# Patient Record
Sex: FEMALE | Race: WHITE | NOT HISPANIC OR LATINO | Employment: OTHER | URBAN - METROPOLITAN AREA
[De-identification: names, ages, dates, MRNs, and addresses within clinical notes are randomized per-mention and may not be internally consistent; named-entity substitution may affect disease eponyms.]

---

## 2017-11-01 ENCOUNTER — TRANSFERRED RECORDS (OUTPATIENT)
Dept: HEALTH INFORMATION MANAGEMENT | Facility: CLINIC | Age: 72
End: 2017-11-01

## 2017-12-06 ENCOUNTER — RADIANT APPOINTMENT (OUTPATIENT)
Dept: MAMMOGRAPHY | Facility: CLINIC | Age: 72
End: 2017-12-06
Payer: COMMERCIAL

## 2017-12-06 ENCOUNTER — OFFICE VISIT (OUTPATIENT)
Dept: OBGYN | Facility: CLINIC | Age: 72
End: 2017-12-06
Payer: COMMERCIAL

## 2017-12-06 ENCOUNTER — RADIANT APPOINTMENT (OUTPATIENT)
Dept: BONE DENSITY | Facility: CLINIC | Age: 72
End: 2017-12-06
Payer: COMMERCIAL

## 2017-12-06 VITALS
BODY MASS INDEX: 27.58 KG/M2 | WEIGHT: 136.8 LBS | SYSTOLIC BLOOD PRESSURE: 142 MMHG | HEART RATE: 84 BPM | DIASTOLIC BLOOD PRESSURE: 88 MMHG | HEIGHT: 59 IN

## 2017-12-06 DIAGNOSIS — Z12.31 VISIT FOR SCREENING MAMMOGRAM: ICD-10-CM

## 2017-12-06 DIAGNOSIS — Z78.0 ASYMPTOMATIC POSTMENOPAUSAL STATE: ICD-10-CM

## 2017-12-06 DIAGNOSIS — Z01.419 ENCOUNTER FOR GYNECOLOGICAL EXAMINATION WITHOUT ABNORMAL FINDING: Primary | ICD-10-CM

## 2017-12-06 PROCEDURE — 99397 PER PM REEVAL EST PAT 65+ YR: CPT | Performed by: OBSTETRICS & GYNECOLOGY

## 2017-12-06 PROCEDURE — G0476 HPV COMBO ASSAY CA SCREEN: HCPCS | Performed by: OBSTETRICS & GYNECOLOGY

## 2017-12-06 PROCEDURE — G0202 SCR MAMMO BI INCL CAD: HCPCS | Mod: TC

## 2017-12-06 PROCEDURE — 77080 DXA BONE DENSITY AXIAL: CPT | Performed by: OBSTETRICS & GYNECOLOGY

## 2017-12-06 PROCEDURE — G0145 SCR C/V CYTO,THINLAYER,RESCR: HCPCS | Performed by: OBSTETRICS & GYNECOLOGY

## 2017-12-06 RX ORDER — ACETAMINOPHEN 160 MG
2000 TABLET,DISINTEGRATING ORAL
COMMUNITY
Start: 2017-01-06

## 2017-12-06 RX ORDER — SIMVASTATIN 20 MG
TABLET ORAL
COMMUNITY
Start: 2017-05-02

## 2017-12-06 ASSESSMENT — PATIENT HEALTH QUESTIONNAIRE - PHQ9
SUM OF ALL RESPONSES TO PHQ QUESTIONS 1-9: 0
5. POOR APPETITE OR OVEREATING: NOT AT ALL

## 2017-12-06 ASSESSMENT — ANXIETY QUESTIONNAIRES
5. BEING SO RESTLESS THAT IT IS HARD TO SIT STILL: NOT AT ALL
3. WORRYING TOO MUCH ABOUT DIFFERENT THINGS: NOT AT ALL
1. FEELING NERVOUS, ANXIOUS, OR ON EDGE: NOT AT ALL
6. BECOMING EASILY ANNOYED OR IRRITABLE: NOT AT ALL
7. FEELING AFRAID AS IF SOMETHING AWFUL MIGHT HAPPEN: NOT AT ALL
2. NOT BEING ABLE TO STOP OR CONTROL WORRYING: NOT AT ALL
GAD7 TOTAL SCORE: 0

## 2017-12-06 NOTE — MR AVS SNAPSHOT
"              After Visit Summary   2017    Susie Lara    MRN: [id number]           Patient Information     Date Of Birth          1945        Visit Information        Provider Department      2017 11:00 AM Dale Oneill MD [facility]        Today's Diagnoses     Encounter for gynecological examination without abnormal finding    -  1       Follow-ups after your visit        Who to contact     If you have questions or need follow up information about today's clinic visit or your schedule please contact [facility] directly at [phone].  Normal or non-critical lab and imaging results will be communicated to you by [employer], letter or phone within 4 business days after the clinic has received the results. If you do not hear from us within 7 days, please contact the clinic through [employer] or phone. If you have a critical or abnormal lab result, we will notify you by phone as soon as possible.  Submit refill requests through [employer] or call your pharmacy and they will forward the refill request to us. Please allow 3 business days for your refill to be completed.          Additional Information About Your Visit        MyChart Information     [employer] lets you send messages to your doctor, view your test results, renew your prescriptions, schedule appointments and more. To sign up, go to [email or web address] . Click on \"Log in\" on the left side of the screen, which will take you to the Welcome page. Then click on \"Sign up Now\" on the right side of the page.     You will be asked to enter the access code listed below, as well as some personal information. Please follow the directions to create your username and password.     Your access code is: [email or web address]  Expires: 3/11/2018  9:14 AM     Your access code will  in 90 days. If you need help or a new code, please call your [place] clinic or [phone].        Care EveryWhere ID     This is your " "Care EveryWhere ID. This could be used by other organizations to access your [place] medical records  [id number]        Your Vitals Were     Pulse Height BMI (Body Mass Index)             84 4' 11.25\" (1.505 m) 27.4 kg/m2          Blood Pressure from Last 3 Encounters:   12/06/17 142/88   10/05/16 130/80   09/15/15 126/82    Weight from Last 3 Encounters:   12/06/17 136 lb 12.8 oz (62.1 kg)   10/05/16 139 lb (63 kg)   09/15/15 141 lb (64 kg)              We Performed the Following     HPV High Risk Types DNA Cervical     Pap imaged thin layer screen with HPV - recommended age 30 - 65        Primary Care Provider Office Phone # Fax #    Travis Hernandez [phone]       [place] [id number] [place] [address] [place] [id number]        Equal Access to Services     CLAUS SOMERS : Hadii aad ku hadasho Soomaali, waaxda luqadaha, qaybta kaalmada adeegyada, waxay devinin hayalexander zaldivar . So [facility] [phone].    ATENCIÓN: Si habla español, tiene a santiago disposición servicios gratuitos de asistencia lingüística. Krystina al [phone].    We comply with applicable federal civil rights laws and [place] laws. We do not discriminate on the basis of race, color, national origin, age, disability, sex, sexual orientation, or gender identity.            Thank you!     Thank you for choosing [facility]  for your care. Our goal is always to provide you with excellent care. Hearing back from our patients is one way we can continue to improve our services. Please take a few minutes to complete the written survey that you may receive in the mail after your visit with us. Thank you!             Your Updated Medication List - Protect others around you: Learn how to safely use, store and throw away your medicines at www.disposemymeds.org.          This list is accurate as of: 12/6/17 11:59 PM.  Always use your most recent med list.                   Brand Name Dispense Instructions " for use Diagnosis    butalbital-acetaminophen-caffeine -40 MG per tablet    FIORICET/ESGIC          simvastatin 20 MG tablet    ZOCOR          vitamin D3 2000 UNITS Caps      Take 2,000 Units by mouth

## 2017-12-06 NOTE — LETTER
December 14, 2017      Susie ARRIAZA Marco  [address] [facility] [address]    Dear ,      I am happy to inform you that your cervical cancer screening test (PAP smear) was normal and your Human Papillomavirus (HPV) test was negative.    Per current guidelines, you no longer need to have pap smears completed. Please return for annual pelvic exams.    Please continue to be seen every year for an annual wellness visit and other preventative tests.     Please contact my office at [phone] if you have further questions.    Sincerely,      Dale Oneill MD/ayah

## 2017-12-06 NOTE — PROGRESS NOTES
Susie is a 72 year old No obstetric history on file. female who presents for annual exam.     Besides routine health maintenance, she has no other health concerns today .    Do you have a Health Care Directive?: No: Advance care planning was reviewed with patient; patient declined at this time.      Fall risk:   Fallen 2 or more times in the past year?: No  Any fall with injury in the past year?: No      HPI: The patient is seen at this time for her annual exam. She has no current health concerns at all. She is here for her screening today.  The patient's PCP is Travis Hernandez MD.      GYNECOLOGIC HISTORY:No LMP recorded. Patient has had a hysterectomy..   reports that she has never smoked. She has never used smokeless tobacco.      Patient is sexually active.  STD testing offered?  Declined  Last PHQ-9 score on record=   PHQ-9 SCORE 12/6/2017   Total Score 0     Last GAD7 score on record=   RENNY-7 SCORE 10/5/2016 12/6/2017   Total Score 0 0     Alcohol Score = 2    HEALTH MAINTENANCE:  Cholesterol: (No results found for: CHOL   Last Mammo: 10/5/16, Result: normal, Next Mammo: today   Pap: 10/5/16 neg  DEXA: 9/15/15  Colonoscopy:  2007 per patient, Result:  normal, Next Colonoscopy: gets them through her pcp. Know she is due    Health maintenance updated:  yes    HISTORY:  Obstetric History     No data available        Patient Active Problem List   Diagnosis     Osteopenia     Past Surgical History:   Procedure Laterality Date     APPENDECTOMY       AS RAD RESEC TONSIL/PILLARS  1957     BREAST BIOPSY, RT/LT  1999    left     BUNIONECTOMY  2003     HYSTERECTOMY  1979    hyster     KIDNEY SURGERY      for stones      Social History   Substance Use Topics     Smoking status: Never Smoker     Smokeless tobacco: Never Used     Alcohol use Yes      Comment: occationally      Problem (# of Occurrences) Relation (Name,Age of Onset)    Colon Cancer (1) Paternal Grandfather    Hypertension (2) Mother, Brother       Negative  "family history of: Coronary Artery Disease, DIABETES            Current Outpatient Prescriptions   Medication Sig     simvastatin (ZOCOR) 20 MG tablet      Cholecalciferol (VITAMIN D3) 2000 UNITS CAPS Take 2,000 Units by mouth     butalbital-acetaminophen-caffeine (FIORICET, ESGIC) -40 MG per tablet      No current facility-administered medications for this visit.        Allergies   Allergen Reactions     Ivp Dye [Contrast Dye]      Morphine      Sulfa Drugs        Past medical, surgical, social and family history were reviewed and updated in EPIC.    ROS:   12 point review of systems negative other than symptoms noted below.  Musculoskeletal: Joint Pain    EXAM:  /88  Pulse 84  Ht 4' 11.25\" (1.505 m)  Wt 136 lb 12.8 oz (62.1 kg)  BMI 27.4 kg/m2   BMI: Body mass index is 27.4 kg/(m^2).    EXAM:  Constitutional: Appearance: Well nourished, well developed alert, in no acute distress  Neck:  Lymph Nodes:  No lymphadenopathy present    Thyroid:  Gland size normal, nontender, no nodules or masses present  on palpation  Chest:  Respiratory Effort:  Breathing unlabored  Cardiovascular:Heart    Auscultation:  Regular rate, normal rhythm, no murmurs present  Breasts: Inspection of Breasts:  No lymphadenopathy present., Palpation of Breasts and Axillae:  No masses present on palpation, no breast tenderness., Axillary Lymph Nodes:  No lymphadenopathy present. and No nodularity, asymmetry or nipple discharge bilaterally.  Gastrointestinal:  Abdominal Examination:  Abdomen nontender to palpation, tone normal without     rigidity or guarding, no masses present, umbilicus without lesions    Liver and speen:  No hepatomegaly present, liver nontender to palpation    Hernias:  No hernias present  Lymphatic: Lymph Nodes:  No other lymphadenopathy present  Skin:  General Inspection:  No rashes present, no lesions present, no areas of  discoloration.    Genitalia and Groin:  No rashes present, no lesions present, no " areas of  discoloration, no masses present  Neurologic/Psychiatric:    Mental Status:  Oriented X3     Pelvic Exam:  External Genitalia:     Normal appearance for age, no discharge present, no tenderness present, no inflammatory lesions present, color normal  Vagina:     Normal vaginal vault without central or paravaginal defects, no discharge present, no inflammatory lesions present, no masses present  Bladder:     Nontender to palpation  Urethra:   Urethral Body:  Urethra palpation normal, urethra structural support normal   Urethral Meatus:  No erythema or lesions present  Cervix:     Surgically absent  Uterus:     Surgically absent  Adnexa:     Surgically absent  Perineum:     Perineum within normal limits, no evidence of trauma, no rashes or skin lesions present  Anus:     Anus within normal limits, no hemorrhoids present  Inguinal Lymph Nodes:     No lymphadenopathy present      COUNSELING:   Reviewed preventive health counseling, as reflected in patient instructions       Regular exercise       Healthy diet/nutrition    BMI:  Body mass index is 27.4 kg/(m^2).     reports that she has never smoked. She has never used smokeless tobacco.        ASSESSMENT:  72 year old female with satisfactory annual exam.  ICD-10-CM    1. Encounter for gynecological examination without abnormal finding Z01.419 Pap imaged thin layer screen with HPV - recommended age 30 - 65     HPV High Risk Types DNA Cervical       PLAN: We will convey the patient's Pap results. She will probably not need Pap smears in the future. She does need ongoing mammography because of a cancerous cells on previous biopsy. We will discuss her bone density with her.      Dale Oneill MD

## 2017-12-07 ASSESSMENT — ANXIETY QUESTIONNAIRES: GAD7 TOTAL SCORE: 0

## 2017-12-08 LAB
COPATH REPORT: NORMAL
PAP: NORMAL

## 2017-12-12 LAB
FINAL DIAGNOSIS: NORMAL
HPV HR 12 DNA CVX QL NAA+PROBE: NEGATIVE
HPV16 DNA SPEC QL NAA+PROBE: NEGATIVE
HPV18 DNA SPEC QL NAA+PROBE: NEGATIVE
SPECIMEN DESCRIPTION: NORMAL

## 2018-12-31 ENCOUNTER — RADIANT APPOINTMENT (OUTPATIENT)
Dept: MAMMOGRAPHY | Facility: CLINIC | Age: 73
End: 2018-12-31
Payer: COMMERCIAL

## 2018-12-31 ENCOUNTER — OFFICE VISIT (OUTPATIENT)
Dept: OBGYN | Facility: CLINIC | Age: 73
End: 2018-12-31
Payer: COMMERCIAL

## 2018-12-31 VITALS
SYSTOLIC BLOOD PRESSURE: 152 MMHG | WEIGHT: 136 LBS | BODY MASS INDEX: 27.42 KG/M2 | DIASTOLIC BLOOD PRESSURE: 84 MMHG | HEIGHT: 59 IN | HEART RATE: 72 BPM

## 2018-12-31 DIAGNOSIS — Z12.31 VISIT FOR SCREENING MAMMOGRAM: ICD-10-CM

## 2018-12-31 DIAGNOSIS — Z01.419 ENCOUNTER FOR GYNECOLOGICAL EXAMINATION WITHOUT ABNORMAL FINDING: Primary | ICD-10-CM

## 2018-12-31 PROCEDURE — G0476 HPV COMBO ASSAY CA SCREEN: HCPCS | Performed by: OBSTETRICS & GYNECOLOGY

## 2018-12-31 PROCEDURE — 77067 SCR MAMMO BI INCL CAD: CPT | Mod: TC

## 2018-12-31 PROCEDURE — 99397 PER PM REEVAL EST PAT 65+ YR: CPT | Performed by: OBSTETRICS & GYNECOLOGY

## 2018-12-31 PROCEDURE — G0145 SCR C/V CYTO,THINLAYER,RESCR: HCPCS | Performed by: OBSTETRICS & GYNECOLOGY

## 2018-12-31 RX ORDER — FEXOFENADINE HCL 180 MG/1
180 TABLET ORAL
COMMUNITY
Start: 2014-06-23

## 2018-12-31 ASSESSMENT — ANXIETY QUESTIONNAIRES
3. WORRYING TOO MUCH ABOUT DIFFERENT THINGS: NOT AT ALL
1. FEELING NERVOUS, ANXIOUS, OR ON EDGE: NOT AT ALL
GAD7 TOTAL SCORE: 0
6. BECOMING EASILY ANNOYED OR IRRITABLE: NOT AT ALL
7. FEELING AFRAID AS IF SOMETHING AWFUL MIGHT HAPPEN: NOT AT ALL
5. BEING SO RESTLESS THAT IT IS HARD TO SIT STILL: NOT AT ALL
2. NOT BEING ABLE TO STOP OR CONTROL WORRYING: NOT AT ALL

## 2018-12-31 ASSESSMENT — PATIENT HEALTH QUESTIONNAIRE - PHQ9
5. POOR APPETITE OR OVEREATING: NOT AT ALL
SUM OF ALL RESPONSES TO PHQ QUESTIONS 1-9: 0

## 2018-12-31 ASSESSMENT — MIFFLIN-ST. JEOR: SCORE: 1027.52

## 2018-12-31 NOTE — Clinical Note
Please abstract the following data from this visit with this patient into the appropriate field in Epic:Colonoscopy done on this date: 11/2017 (approximately), by this group: [facility], results were polyps-repeat 5 years.

## 2018-12-31 NOTE — NURSING NOTE
Please abstract the following data from this visit with this patient into the appropriate field in Epic:    Colonoscopy done on this date: 11/2017 (approximately), by this group: [facility], results were polyps-repeat 5 years.

## 2018-12-31 NOTE — LETTER
January 9, 2019    Susie Foremanley  [address] [facility][1] [address]    Dear ,  This letter is in regards to your recent Pap smear (cervical cancer screening).  We are happy to inform you that your recent Pap smear result is normal. Cervical cancer is closely linked with certain types of Human Papillomavirus (HPV). Your results showed no evidence of high risk HPV.  Therefore we recommend you return for annual pelvic exams.  You will still need to return to the clinic every year for an annual exam and other preventive tests.  If you have additional questions regarding this result, please call our registered nurse, Yumiko at [phone].  Sincerely,    Dale Oneill MD/ayah

## 2018-12-31 NOTE — PROGRESS NOTES
Susie is a 73 year old No obstetric history on file. female who presents for annual exam.     Besides routine health maintenance, she has no other health concerns today .    Do you have a Health Care Directive?: No: Advance care planning was reviewed with patient; patient declined at this time.    Fall risk:   Fallen 2 or more times in the past year?: No  Any fall with injury in the past year?: No    HPI: The patient is seen at this time for her annual GYN exam.  Her health is excellent.  She has had a previous hysterectomy.  She is on no replacement therapy.  She will not need any further Pap smear testing after this year.  The patient's PCP is  Travis Hernandez MD.    GYNECOLOGIC HISTORY:  No LMP recorded. Patient has had a hysterectomy..   reports that  has never smoked. she has never used smokeless tobacco.    Patient is sexually active.  STD testing offered?  Declined  Last PHQ-9 score on record=   PHQ-9 SCORE 12/31/2018   PHQ-9 Total Score 0     Last GAD7 score on record=   RENNY-7 SCORE 10/5/2016 12/6/2017 12/31/2018   Total Score 0 0 0     Alcohol Score = 2    HEALTH MAINTENANCE:  Cholesterol: (No results found for: CHOL   Last Mammo: one year ago, Result: normal, Next Mammo: today   Pap:   Lab Results   Component Value Date    PAP NIL HPV- 12/06/2017    PAP NIL 10/05/2016    PAP NIL 09/15/2015      DEXA: 12/6/17  Colonoscopy:  11/2017, Result:  polyps, Next Colonoscopy: 5 years.    Health maintenance updated:  yes    HISTORY:  Obstetric History     No data available        Patient Active Problem List   Diagnosis     Osteopenia     Past Surgical History:   Procedure Laterality Date     APPENDECTOMY       AS RAD RESEC TONSIL/PILLARS  1957     BREAST BIOPSY, RT/LT  1999    left     BUNIONECTOMY  2003     HYSTERECTOMY  1979    hyster     HYSTERECTOMY, PAP NO LONGER INDICATED       KIDNEY SURGERY      for stones      Social History     Tobacco Use     Smoking status: Never Smoker     Smokeless tobacco: Never Used  "  Substance Use Topics     Alcohol use: Yes     Comment: occationally      Problem (# of Occurrences) Relation (Name,Age of Onset)    Colon Cancer (1) Paternal Grandfather    Hypertension (2) Mother, Brother       Negative family history of: Coronary Artery Disease, Diabetes            Current Outpatient Medications   Medication Sig     butalbital-acetaminophen-caffeine (FIORICET, ESGIC) -40 MG per tablet      Cholecalciferol (VITAMIN D3) 2000 UNITS CAPS Take 2,000 Units by mouth     fexofenadine (ALLEGRA) 180 MG tablet Take 180 mg by mouth     omeprazole (PRILOSEC) 20 MG DR capsule      simvastatin (ZOCOR) 20 MG tablet      No current facility-administered medications for this visit.        Allergies   Allergen Reactions     Ivp Dye [Contrast Dye]      Morphine      Sulfa Drugs        Past medical, surgical, social and family history were reviewed and updated in EPIC.    ROS:   12 point review of systems negative other than symptoms noted below.  Head: Earache and Nasal Congestion    EXAM:  /84   Pulse 72   Ht 1.499 m (4' 11\")   Wt 61.7 kg (136 lb)   BMI 27.47 kg/m     BMI: Body mass index is 27.47 kg/m .    EXAM:  Constitutional: Appearance: Well nourished, well developed alert, in no acute distress  Neck:  Lymph Nodes:  No lymphadenopathy present    Thyroid:  Gland size normal, nontender, no nodules or masses present  on palpation  Chest:  Respiratory Effort:  Breathing unlabored  Cardiovascular:Heart    Auscultation:  Regular rate, normal rhythm, no murmurs present  Breasts: Inspection of Breasts:  No lymphadenopathy present., Palpation of Breasts and Axillae:  No masses present on palpation, no breast tenderness., Axillary Lymph Nodes:  No lymphadenopathy present. and No nodularity, asymmetry or nipple discharge bilaterally.  Gastrointestinal:  Abdominal Examination:  Abdomen nontender to palpation, tone normal without     rigidity or guarding, no masses present, umbilicus without " lesions    Liver and speen:  No hepatomegaly present, liver nontender to palpation    Hernias:  No hernias present  Lymphatic: Lymph Nodes:  No other lymphadenopathy present  Skin:  General Inspection:  No rashes present, no lesions present, no areas of  discoloration.    Genitalia and Groin:  No rashes present, no lesions present, no areas of  discoloration, no masses present  Neurologic/Psychiatric:    Mental Status:  Oriented X3     Pelvic Exam:  External Genitalia:     Normal appearance for age, no discharge present, no tenderness present, no inflammatory lesions present, color normal  Vagina:     Normal vaginal vault without central or paravaginal defects, no discharge present, no inflammatory lesions present, no masses present  Bladder:     Nontender to palpation  Urethra:   Urethral Body:  Urethra palpation normal, urethra structural support normal   Urethral Meatus:  No erythema or lesions present  Cervix:     Surgically absent  Uterus:     Surgically absent  Adnexa:     Surgically absent  Perineum:     Perineum within normal limits, no evidence of trauma, no rashes or skin lesions present  Anus:     Anus within normal limits, no hemorrhoids present  Inguinal Lymph Nodes:     No lymphadenopathy present    COUNSELING:   Reviewed preventive health counseling, as reflected in patient instructions       Regular exercise       Healthy diet/nutrition    BMI:  Body mass index is 27.47 kg/m .     reports that  has never smoked. she has never used smokeless tobacco.      ASSESSMENT:  73 year old female with satisfactory annual exam.    ICD-10-CM    1. Encounter for gynecological examination without abnormal finding Z01.419 Pap imaged thin layer screen with HPV - recommended age 30 - 65     HPV High Risk Types DNA Cervical       PLAN: We will convey the patient's Pap and mammogram results.  We have encouraged her to continue annual exams.      aDle Oneill MD

## 2019-01-01 ASSESSMENT — ANXIETY QUESTIONNAIRES: GAD7 TOTAL SCORE: 0

## 2019-01-04 LAB
COPATH REPORT: NORMAL
PAP: NORMAL

## 2019-01-08 LAB
FINAL DIAGNOSIS: NORMAL
HPV HR 12 DNA CVX QL NAA+PROBE: NEGATIVE
HPV16 DNA SPEC QL NAA+PROBE: NEGATIVE
HPV18 DNA SPEC QL NAA+PROBE: NEGATIVE
SPECIMEN DESCRIPTION: NORMAL
SPECIMEN SOURCE CVX/VAG CYTO: NORMAL

## 2021-08-03 NOTE — PROGRESS NOTES
Susie is a 76 year old No obstetric history on file. female who presents for Medicare Limited exam.     Do you have a Health Care Directive?: No, advance care planning information given to patient to review.  Patient plans to discuss their wishes with loved ones or provider.      Fall risk:   Fallen 2 or more times in the past year?: No  Any fall with injury in the past year?: No    HPI : The patient is seen at this time for her annual GYN exam.  She has had a hysterectomy and needs no Pap screening.      GYNECOLOGIC HISTORY:  No LMP recorded. Patient has had a hysterectomy..   reports that she has never smoked. She has never used smokeless tobacco.    STD testing offered?  Declined  Last PHQ-9 score on record=   PHQ-9 SCORE 8/4/2021   PHQ-9 Total Score 0     Last GAD7 score on record=   RENNY-7 SCORE 12/6/2017 12/31/2018 8/4/2021   Total Score 0 0 0       HEALTH MAINTENANCE:  Cholesterol: 09/27/2018   Total= 196, Triglycerides=82, HDL=73, HIE=011, FBS=94  Last Mammo: 12/31/2018, Result: Normal, Next Mammo: Today   Pap:   Lab Results   Component Value Date    PAP NIL, HPV- 12/31/2018    PAP NIL 12/06/2017    PAP NIL 10/05/2016      DEXA: 12/06/2017  Colonoscopy:  11/2017, Result:  Polyps, Next Colonoscopy: 1 year.    HISTORY:  OB History   No obstetric history on file.     Past Medical History:   Diagnosis Date     Hiatal hernia      Hypercholesteraemia      Nephrolithiasis      Osteoporosis      Past Surgical History:   Procedure Laterality Date     APPENDECTOMY       AS RAD RESEC TONSIL/PILLARS  1957     BREAST BIOPSY, RT/LT  1999    left     BUNIONECTOMY  2003     HYSTERECTOMY  1979    hyster     HYSTERECTOMY, PAP NO LONGER INDICATED       KIDNEY SURGERY      for stones     Family History   Problem Relation Age of Onset     Hypertension Mother      Hypertension Brother      Colon Cancer Paternal Grandfather      Coronary Artery Disease No family hx of      Diabetes No family hx of      Social History      Socioeconomic History     Marital status:      Spouse name: Not on file     Number of children: Not on file     Years of education: Not on file     Highest education level: Not on file   Occupational History     Not on file   Tobacco Use     Smoking status: Never Smoker     Smokeless tobacco: Never Used   Substance and Sexual Activity     Alcohol use: Yes     Comment: occationally     Drug use: No     Sexual activity: Yes     Partners: Male     Birth control/protection: Female Surgical, Post-menopausal     Comment: hysterectomy   Other Topics Concern     Parent/sibling w/ CABG, MI or angioplasty before 65F 55M? Not Asked   Social History Narrative     Not on file     Social Determinants of Health     Financial Resource Strain:      Difficulty of Paying Living Expenses:    Food Insecurity:      Worried About Running Out of Food in the Last Year:      Ran Out of Food in the Last Year:    Transportation Needs:      Lack of Transportation (Medical):      Lack of Transportation (Non-Medical):    Physical Activity:      Days of Exercise per Week:      Minutes of Exercise per Session:    Stress:      Feeling of Stress :    Social Connections:      Frequency of Communication with Friends and Family:      Frequency of Social Gatherings with Friends and Family:      Attends [employer] Services:      Active Member of Clubs or Organizations:      Attends Club or Organization Meetings:      Marital Status:    Intimate Partner Violence:      Fear of Current or Ex-Partner:      Emotionally Abused:      Physically Abused:      Sexually Abused:      Current Outpatient Medications   Medication Sig     Cholecalciferol (VITAMIN D3) 2000 UNITS CAPS Take 2,000 Units by mouth     simvastatin (ZOCOR) 20 MG tablet      butalbital-acetaminophen-caffeine (FIORICET, ESGIC) -40 MG per tablet  (Patient not taking: Reported on 8/4/2021)     fexofenadine (ALLEGRA) 180 MG tablet Take 180 mg by mouth (Patient not taking: Reported on  "8/4/2021)     hydrochlorothiazide (HYDRODIURIL) 12.5 MG tablet Take 12.5 mg by mouth (Patient not taking: Reported on 8/4/2021)     No current facility-administered medications for this visit.     Allergies   Allergen Reactions     Ivp Dye [Contrast Dye]      Morphine      Sulfa Drugs        Past medical, surgical, social and family history were reviewed and updated in EPIC.    EXAM:  BP (!) 154/98   Pulse 74   Ht 1.499 m (4' 11\")   Wt 60.3 kg (133 lb)   Breastfeeding No   BMI 26.86 kg/m     BMI: Body mass index is 26.86 kg/m .    Constitutional: Appearance: Well nourished, well developed alert, in no acute distress  Breasts: Inspection of Breasts:  No lymphadenopathy present    Palpation of Breasts and Axillae:  No masses present on palpation, no  breast tenderness    Axillary Lymph Nodes:  No lymphadenopathy present  Neurologic/Psychiatric:    Mental Status:  Oriented X3     Pelvic Exam:  External Genitalia:     Normal appearance for age, no discharge present, no tenderness present, no inflammatory lesions present, color normal  Vagina:     Normal vaginal vault without central or paravaginal defects, no discharge present, no inflammatory lesions present, no masses present  Bladder:     Nontender to palpation  Urethra:   Urethral Body:  Urethra palpation normal, urethra structural support normal   Urethral Meatus:  No erythema or lesions present  Cervix:     Surgically absent  Uterus:     Surgically absent  Adnexa:     Surgically absent  Perineum:     Perineum within normal limits, no evidence of trauma, no rashes or skin lesions present  Anus:     Anus within normal limits, no hemorrhoids present  Inguinal Lymph Nodes:     No lymphadenopathy present    Body mass index is 26.86 kg/m .     reports that she has never smoked. She has never used smokeless tobacco.      ASSESSMENT:  76 year old female with satisfactory annual exam.    ICD-10-CM    1. Encounter for gynecological examination without abnormal finding  " Z01.419        COUNSELING:   Reviewed preventive health counseling, as reflected in patient instructions       Regular exercise       Healthy diet/nutrition    PLAN/PATIENT INSTRUCTIONS: The patient will continue her yearly breast exam but does not need Pap smears.        Dale Oneill MD

## 2021-08-04 ENCOUNTER — OFFICE VISIT (OUTPATIENT)
Dept: OBGYN | Facility: CLINIC | Age: 76
End: 2021-08-04
Payer: MEDICARE

## 2021-08-04 ENCOUNTER — ANCILLARY PROCEDURE (OUTPATIENT)
Dept: BONE DENSITY | Facility: CLINIC | Age: 76
End: 2021-08-04
Payer: MEDICARE

## 2021-08-04 ENCOUNTER — ANCILLARY PROCEDURE (OUTPATIENT)
Dept: MAMMOGRAPHY | Facility: CLINIC | Age: 76
End: 2021-08-04
Payer: MEDICARE

## 2021-08-04 VITALS
WEIGHT: 133 LBS | HEIGHT: 59 IN | SYSTOLIC BLOOD PRESSURE: 154 MMHG | HEART RATE: 74 BPM | DIASTOLIC BLOOD PRESSURE: 98 MMHG | BODY MASS INDEX: 26.81 KG/M2

## 2021-08-04 DIAGNOSIS — Z12.31 VISIT FOR SCREENING MAMMOGRAM: ICD-10-CM

## 2021-08-04 DIAGNOSIS — Z78.0 ASYMPTOMATIC POSTMENOPAUSAL STATE: ICD-10-CM

## 2021-08-04 DIAGNOSIS — Z01.419 ENCOUNTER FOR GYNECOLOGICAL EXAMINATION WITHOUT ABNORMAL FINDING: Primary | ICD-10-CM

## 2021-08-04 PROCEDURE — G0101 CA SCREEN;PELVIC/BREAST EXAM: HCPCS | Performed by: OBSTETRICS & GYNECOLOGY

## 2021-08-04 PROCEDURE — 77067 SCR MAMMO BI INCL CAD: CPT | Mod: TC | Performed by: RADIOLOGY

## 2021-08-04 PROCEDURE — 77080 DXA BONE DENSITY AXIAL: CPT | Performed by: OBSTETRICS & GYNECOLOGY

## 2021-08-04 RX ORDER — HYDROCHLOROTHIAZIDE 12.5 MG/1
12.5 TABLET ORAL
COMMUNITY
Start: 2021-07-15

## 2021-08-04 ASSESSMENT — ANXIETY QUESTIONNAIRES
GAD7 TOTAL SCORE: 0
5. BEING SO RESTLESS THAT IT IS HARD TO SIT STILL: NOT AT ALL
IF YOU CHECKED OFF ANY PROBLEMS ON THIS QUESTIONNAIRE, HOW DIFFICULT HAVE THESE PROBLEMS MADE IT FOR YOU TO DO YOUR WORK, TAKE CARE OF THINGS AT HOME, OR GET ALONG WITH OTHER PEOPLE: NOT DIFFICULT AT ALL
7. FEELING AFRAID AS IF SOMETHING AWFUL MIGHT HAPPEN: NOT AT ALL
1. FEELING NERVOUS, ANXIOUS, OR ON EDGE: NOT AT ALL
6. BECOMING EASILY ANNOYED OR IRRITABLE: NOT AT ALL
2. NOT BEING ABLE TO STOP OR CONTROL WORRYING: NOT AT ALL
3. WORRYING TOO MUCH ABOUT DIFFERENT THINGS: NOT AT ALL

## 2021-08-04 ASSESSMENT — PATIENT HEALTH QUESTIONNAIRE - PHQ9
SUM OF ALL RESPONSES TO PHQ QUESTIONS 1-9: 0
5. POOR APPETITE OR OVEREATING: NOT AT ALL

## 2021-08-04 ASSESSMENT — MIFFLIN-ST. JEOR: SCORE: 998.91

## 2021-08-05 ASSESSMENT — ANXIETY QUESTIONNAIRES: GAD7 TOTAL SCORE: 0

## 2022-09-21 ENCOUNTER — ANCILLARY PROCEDURE (OUTPATIENT)
Dept: MAMMOGRAPHY | Facility: CLINIC | Age: 77
End: 2022-09-21
Attending: INTERNAL MEDICINE
Payer: MEDICARE

## 2022-09-21 DIAGNOSIS — Z12.31 VISIT FOR SCREENING MAMMOGRAM: ICD-10-CM

## 2022-09-21 PROCEDURE — 77067 SCR MAMMO BI INCL CAD: CPT | Mod: TC | Performed by: RADIOLOGY

## 2023-09-22 ENCOUNTER — MEDICAL CORRESPONDENCE (OUTPATIENT)
Dept: OBGYN | Facility: CLINIC | Age: 78
End: 2023-09-22

## 2023-09-22 ENCOUNTER — HOSPITAL ENCOUNTER (OUTPATIENT)
Dept: MAMMOGRAPHY | Facility: CLINIC | Age: 78
Discharge: HOME | End: 2023-09-22
Attending: INTERNAL MEDICINE | Admitting: INTERNAL MEDICINE
Payer: MEDICARE

## 2023-09-22 DIAGNOSIS — Z12.31 VISIT FOR SCREENING MAMMOGRAM: ICD-10-CM

## 2023-09-22 PROCEDURE — 77067 SCR MAMMO BI INCL CAD: CPT

## 2023-10-23 ENCOUNTER — ANCILLARY PROCEDURE (OUTPATIENT)
Dept: BONE DENSITY | Facility: CLINIC | Age: 78
End: 2023-10-23
Payer: MEDICARE

## 2023-10-23 DIAGNOSIS — Z78.0 ASYMPTOMATIC POSTMENOPAUSAL STATE: ICD-10-CM

## 2023-10-23 PROCEDURE — 77080 DXA BONE DENSITY AXIAL: CPT | Mod: TC | Performed by: RADIOLOGY

## 2024-09-25 ENCOUNTER — HOSPITAL ENCOUNTER (OUTPATIENT)
Dept: MAMMOGRAPHY | Facility: CLINIC | Age: 79
Discharge: HOME | End: 2024-09-25
Attending: INTERNAL MEDICINE | Admitting: INTERNAL MEDICINE
Payer: MEDICARE

## 2024-09-25 DIAGNOSIS — Z12.31 VISIT FOR SCREENING MAMMOGRAM: ICD-10-CM

## 2024-09-25 PROCEDURE — 77063 BREAST TOMOSYNTHESIS BI: CPT
